# Patient Record
Sex: FEMALE | Race: OTHER | NOT HISPANIC OR LATINO | URBAN - METROPOLITAN AREA
[De-identification: names, ages, dates, MRNs, and addresses within clinical notes are randomized per-mention and may not be internally consistent; named-entity substitution may affect disease eponyms.]

---

## 2022-05-08 ENCOUNTER — EMERGENCY (EMERGENCY)
Facility: HOSPITAL | Age: 27
LOS: 1 days | Discharge: HOME | End: 2022-05-08
Attending: EMERGENCY MEDICINE
Payer: SELF-PAY

## 2022-05-08 VITALS
TEMPERATURE: 100 F | DIASTOLIC BLOOD PRESSURE: 73 MMHG | HEART RATE: 117 BPM | OXYGEN SATURATION: 98 % | RESPIRATION RATE: 16 BRPM | WEIGHT: 184.97 LBS | SYSTOLIC BLOOD PRESSURE: 133 MMHG | HEIGHT: 63 IN

## 2022-05-08 PROCEDURE — 93005 ELECTROCARDIOGRAM TRACING: CPT

## 2022-05-08 PROCEDURE — 99284 EMERGENCY DEPT VISIT MOD MDM: CPT

## 2022-05-08 PROCEDURE — 71046 X-RAY EXAM CHEST 2 VIEWS: CPT

## 2022-05-08 PROCEDURE — 71046 X-RAY EXAM CHEST 2 VIEWS: CPT | Mod: 26

## 2022-05-08 PROCEDURE — 99283 EMERGENCY DEPT VISIT LOW MDM: CPT | Mod: 25

## 2022-05-08 RX ORDER — IBUPROFEN 200 MG
600 TABLET ORAL ONCE
Refills: 0 | Status: COMPLETED | OUTPATIENT
Start: 2022-05-08 | End: 2022-05-08

## 2022-05-08 RX ORDER — ACETAMINOPHEN 500 MG
975 TABLET ORAL ONCE
Refills: 0 | Status: COMPLETED | OUTPATIENT
Start: 2022-05-08 | End: 2022-05-08

## 2022-05-08 RX ADMIN — Medication 50 MILLIGRAM(S): at 21:40

## 2022-05-08 RX ADMIN — Medication 600 MILLIGRAM(S): at 21:40

## 2022-05-08 RX ADMIN — Medication 975 MILLIGRAM(S): at 21:41

## 2022-05-08 NOTE — ED PROVIDER NOTE - PHYSICAL EXAMINATION
GENERAL: well appearing, no acute distress, coughing during exam   HEAD: atraumatic   EYES: EOMI   ENT: moist oral mucosa   CARDIAC: tachy  RESPIRATORY: lungs clear, no increased work of breathing   MUSCULOSKELETAL: no deformity   NEUROLOGICAL: alert, spontaneous movement of extremities   SKIN: no visible rash  PSYCHIATRIC: cooperative

## 2022-05-08 NOTE — ED PROVIDER NOTE - NS ED ROS FT
CONSTITUTIONAL: +fever  EYES: no eye pain   ENMT: +throat pain  CARDIOVASCULAR: no chest pain   RESPIRATORY: +cough, no shortness of breath   GASTROINTESTINAL: no abdominal pain   GENITOURINARY: no dysuria   SKIN: no rashes  NEUROLOGICAL: no headache

## 2022-05-08 NOTE — ED PROVIDER NOTE - PATIENT PORTAL LINK FT
You can access the FollowMyHealth Patient Portal offered by [facility] by registering at the following website: [email or web address]. By joining [facility]’s FollowMyHealth portal, you will also be able to view your health information using other applications (apps) compatible with our system.

## 2022-05-08 NOTE — ED ADULT NURSE NOTE - CHIEF COMPLAINT QUOTE
referral from urgent care with c/o chest tightness  and dry cough since friday, with abnormal ekg result

## 2022-05-08 NOTE — ED PROVIDER NOTE - PROGRESS NOTE DETAILS
EKG - nsr, rate 109, QTc 412, twi lead III CXR - lungs clear  Dc supportive care  Discussed indications for patient return to ED. Patient understood.

## 2022-05-08 NOTE — ED PROVIDER NOTE - CLINICAL SUMMARY MEDICAL DECISION MAKING FREE TEXT BOX
27 yo F with suspected viral syndrome. Pt is no currently on OCPs. PERC negative. Will give meds, EKG, xray, dc.

## 2022-05-08 NOTE — ED PROVIDER NOTE - OBJECTIVE STATEMENT
27 yo F denies pmh presents from  for eval of "abnormal EKG." Pt reports 2 days of fever, throat pain, cough, body aches. EKG at  sinus tachycardia. Pt also reports briefly taking OCPs last month, but no longer takes them. Denies other acute complaints.

## 2023-04-19 NOTE — ED ADULT NURSE NOTE - NSIMPLEMENTINTERV_GEN_ALL_ED
Implemented All Universal Safety Interventions:  [place] to call system. Call bell, personal items and telephone within reach. Instruct patient to call for assistance. Room bathroom lighting operational. Non-slip footwear when patient is off stretcher. Physically safe environment: no spills, clutter or unnecessary equipment. Stretcher in lowest position, wheels locked, appropriate side rails in place.
Yes